# Patient Record
Sex: FEMALE | URBAN - METROPOLITAN AREA
[De-identification: names, ages, dates, MRNs, and addresses within clinical notes are randomized per-mention and may not be internally consistent; named-entity substitution may affect disease eponyms.]

---

## 2023-09-27 ENCOUNTER — APPOINTMENT (OUTPATIENT)
Dept: URBAN - METROPOLITAN AREA CLINIC 318 | Age: 58
Setting detail: DERMATOLOGY
End: 2023-09-28

## 2023-09-27 DIAGNOSIS — L50.8 OTHER URTICARIA: ICD-10-CM

## 2023-09-27 PROBLEM — L50.1 IDIOPATHIC URTICARIA: Status: ACTIVE | Noted: 2023-09-27

## 2023-09-27 PROCEDURE — 99203 OFFICE O/P NEW LOW 30 MIN: CPT

## 2023-09-27 PROCEDURE — OTHER PRESCRIPTION MEDICATION MANAGEMENT: OTHER

## 2023-09-27 PROCEDURE — OTHER ADDITIONAL NOTES: OTHER

## 2023-09-27 PROCEDURE — OTHER PRESCRIPTION: OTHER

## 2023-09-27 PROCEDURE — OTHER OTC TREATMENT REGIMEN: OTHER

## 2023-09-27 PROCEDURE — OTHER MIPS QUALITY: OTHER

## 2023-09-27 PROCEDURE — OTHER COUNSELING: OTHER

## 2023-09-27 RX ORDER — LORATADINE 10 MG/1
TABLET ORAL
Qty: 30 | Refills: 2 | Status: ERX | COMMUNITY
Start: 2023-09-27

## 2023-09-27 RX ORDER — SALICYLIC ACID 3 G/100G
LOTION TOPICAL
Qty: 30 | Refills: 2 | Status: ERX | COMMUNITY
Start: 2023-09-27

## 2023-09-27 ASSESSMENT — LOCATION ZONE DERM: LOCATION ZONE: LEG

## 2023-09-27 ASSESSMENT — LOCATION DETAILED DESCRIPTION DERM: LOCATION DETAILED: LEFT ANTERIOR PROXIMAL THIGH

## 2023-09-27 ASSESSMENT — LOCATION SIMPLE DESCRIPTION DERM: LOCATION SIMPLE: LEFT THIGH

## 2023-09-27 NOTE — PROCEDURE: PRESCRIPTION MEDICATION MANAGEMENT
Discontinue Regimen: Benadryl
Detail Level: Zone
Initiate Treatment: Allegra Allergy 180 mg tablet \\nQuantity: 30.0 Tablet  Days Supply: 30\\nSig: Take 1 po daily\\n\\nClaritin 10 mg tablet \\nQuantity: 30.0 Tablet  Days Supply: 30\\nSig: Take 1 po daily
Render In Strict Bullet Format?: No

## 2023-09-27 NOTE — HPI: RASH
How Severe Is Your Rash?: moderate
Is This A New Presentation, Or A Follow-Up?: Rash
Additional History: Patient did a virtual in the past where she was given Benadryl and calamine lotion

## 2023-09-27 NOTE — PROCEDURE: ADDITIONAL NOTES
Additional Notes: IF PATIENT IS NOT BETTER ON HER FOLLOW UP DUPIXENT PAPERWORK WILL BE INITIATED
Detail Level: Simple
Render Risk Assessment In Note?: no